# Patient Record
Sex: FEMALE | Race: WHITE | NOT HISPANIC OR LATINO | URBAN - METROPOLITAN AREA
[De-identification: names, ages, dates, MRNs, and addresses within clinical notes are randomized per-mention and may not be internally consistent; named-entity substitution may affect disease eponyms.]

---

## 2024-11-05 ENCOUNTER — OFFICE VISIT (OUTPATIENT)
Dept: URBAN - METROPOLITAN AREA CLINIC 19 | Facility: CLINIC | Age: 74
End: 2024-11-05
Payer: COMMERCIAL

## 2024-11-05 VITALS
SYSTOLIC BLOOD PRESSURE: 130 MMHG | DIASTOLIC BLOOD PRESSURE: 80 MMHG | HEART RATE: 75 BPM | WEIGHT: 124 LBS | TEMPERATURE: 98.1 F | BODY MASS INDEX: 22.82 KG/M2 | HEIGHT: 62 IN | OXYGEN SATURATION: 98 %

## 2024-11-05 DIAGNOSIS — R19.7 DIARRHEA: ICD-10-CM

## 2024-11-05 DIAGNOSIS — Z09 HOSPITAL DISCHARGE FOLLOW-UP: ICD-10-CM

## 2024-11-05 PROCEDURE — 99203 OFFICE O/P NEW LOW 30 MIN: CPT

## 2024-11-05 RX ORDER — EZETIMIBE 10 MG/1
1 TABLET TABLET ORAL ONCE A DAY
Status: ACTIVE | COMMUNITY

## 2024-11-05 RX ORDER — ROSUVASTATIN CALCIUM 20 MG/1
1 TABLET TABLET, COATED ORAL ONCE A DAY
Status: ACTIVE | COMMUNITY

## 2024-11-05 RX ORDER — GABAPENTIN 300 MG/1
1 CAPSULE CAPSULE ORAL ONCE A DAY
Status: ACTIVE | COMMUNITY

## 2024-11-05 NOTE — HPI-TODAY'S VISIT:
74-year-old female with PMH of depression, CAD, osteoporosis, OA, RA, anemia, and IBS is here for hospital follow-up.  She presented to [facility] on 11/1/2024 with complaints of diarrhea x 6 days and associated lightheadedness, abdominal pain and nausea.  Patient tried Imodium without much relief.  Her CT A/P with contrast revealed overall a peculiar appearance of the colon, which is mainly decompressed.  There is some mural stratification with some prominence of the subchondral region within the transverse colon, splenic flexure, ascending colon and proximal descending colon.  Nonspecific, but likely related to patient's reported diarrhea.  Cannot exclude colitis.  Small hiatal hernia.  Chest x-ray showed no acute abnormality.  She was unable to provide a stool sample and was discharged with Levsin, Zofran and Augmentin every 12 hours for 7 days.  Patient denies recent antibiotics or foreign travel.  Today, she reports her diarrhea has improved. Her LBM was yesterday, [place] [address]. She reports having diarrhea episodes that last for about three days and resolves on its own. She is unsure if it's related to diet. Lately, after eating she reports needing to rush to the restroom. Denies blood in the stool. Her last colonoscopy was done two years ago and reportedly showed polyps.  Labs: Unremarkable CBC with differential, low potassium 3.0, BUN 6 otherwise unremarkable CMP.

## 2024-11-06 LAB
A/G RATIO: 1.6
ABSOLUTE BASOPHILS: 61
ABSOLUTE EOSINOPHILS: 163
ABSOLUTE LYMPHOCYTES: 2931
ABSOLUTE MONOCYTES: 462
ABSOLUTE NEUTROPHILS: 3182
ALBUMIN: 4.4
ALKALINE PHOSPHATASE: 81
ALT (SGPT): 13
AST (SGOT): 16
BASOPHILS: 0.9
BILIRUBIN, TOTAL: 0.5
BUN/CREATININE RATIO: (no result)
BUN: 10
C-REACTIVE PROTEIN, QUANT: <3
CALCIUM: 10.2
CARBON DIOXIDE, TOTAL: 29
CHLORIDE: 102
CREATININE: 0.74
EGFR: 84
EOSINOPHILS: 2.4
GLOBULIN, TOTAL: 2.7
GLUCOSE: 81
HEMATOCRIT: 42.2
HEMOGLOBIN: 13.6
IMMUNOGLOBULIN A, QN, SERUM: 204
INTERPRETATION: (no result)
LYMPHOCYTES: 43.1
MCH: 29.1
MCHC: 32.2
MCV: 90.4
MONOCYTES: 6.8
MPV: 11.5
NEUTROPHILS: 46.8
PLATELET COUNT: 314
POTASSIUM: 3.8
PROTEIN, TOTAL: 7.1
RDW: 11.7
RED BLOOD CELL COUNT: 4.67
SODIUM: 140
T-TRANSGLUTAMINASE (TTG) IGA: <1
TSH W/REFLEX TO FT4: 1.9
WHITE BLOOD CELL COUNT: 6.8

## 2024-11-12 ENCOUNTER — TELEPHONE ENCOUNTER (OUTPATIENT)
Dept: URBAN - METROPOLITAN AREA CLINIC 5 | Facility: CLINIC | Age: 74
End: 2024-11-12

## 2024-11-19 ENCOUNTER — OFFICE VISIT (OUTPATIENT)
Dept: URBAN - METROPOLITAN AREA CLINIC 19 | Facility: CLINIC | Age: 74
End: 2024-11-19

## 2024-11-19 ENCOUNTER — DASHBOARD ENCOUNTERS (OUTPATIENT)
Age: 74
End: 2024-11-19

## 2024-11-19 VITALS
HEART RATE: 91 BPM | SYSTOLIC BLOOD PRESSURE: 100 MMHG | DIASTOLIC BLOOD PRESSURE: 80 MMHG | WEIGHT: 126.6 LBS | TEMPERATURE: 98.1 F | OXYGEN SATURATION: 95 % | BODY MASS INDEX: 23.3 KG/M2 | HEIGHT: 62 IN

## 2024-11-19 LAB
CALPROTECTIN, FECAL: 37
CAMPYLOBACTER SPP. AG,EIA: (no result)
CLOSTRIDIUM DIFFICILE TOXINB,QL REAL TIME PCR: NOT DETECTED
FECAL FAT, QUALITATIVE: NORMAL
GIARDIA AG, EIA, STOOL: (no result)
LEUKOCYTES: (no result)
OVA AND PARASITES, CONC AND PERM SMEAR: (no result)
PANCREATIC ELASTASE, FECAL: >800
SALMONELLA AND SHIGELLA, CULTURE: (no result)
SHIGA TOXINS, EIA W/RFL TO E.COLI O157 CULTURE: (no result)

## 2024-11-19 RX ORDER — ROSUVASTATIN CALCIUM 20 MG/1
1 TABLET TABLET, COATED ORAL ONCE A DAY
Status: ACTIVE | COMMUNITY

## 2024-11-19 RX ORDER — GABAPENTIN 300 MG/1
1 CAPSULE CAPSULE ORAL ONCE A DAY
Status: ACTIVE | COMMUNITY

## 2024-11-19 RX ORDER — EZETIMIBE 10 MG/1
1 TABLET TABLET ORAL ONCE A DAY
Status: ACTIVE | COMMUNITY

## 2024-11-19 NOTE — HPI-TODAY'S VISIT:
Ms. Villegas is a 74-year-old female with PMH of CAD, osteoporosis, depression, RA, OA, IBS, and anemia is here for reevaluation of her diarrhea.  Last seen on 11/5/2024 as hospital follow-up.  Stool studies were recommended but not done. Today, she reports continued diarrhea 15 minutes after eating  Previous history summarized below: She presented to [facility] on 11/1/2024 with complaints of diarrhea x 6 days and associated lightheadedness, abdominal pain and nausea. Patient tried Imodium without much relief. Her CT A/P with contrast revealed overall a peculiar appearance of the colon, which is mainly decompressed. There is some mural stratification with some prominence of the subchondral region within the transverse colon, splenic flexure, ascending colon and proximal descending colon. Nonspecific, but likely related to patient's reported diarrhea. Cannot exclude colitis. Small hiatal hernia. Chest x-ray showed no acute abnormality. She was unable to provide a stool sample and was discharged with Levsin, Zofran and Augmentin every 12 hours for 7 days. Labs: Unremarkable CBC with differential, low potassium 3.0, BUN 6 otherwise unremarkable CMP.

## 2024-11-20 ENCOUNTER — TELEPHONE ENCOUNTER (OUTPATIENT)
Dept: URBAN - METROPOLITAN AREA CLINIC 19 | Facility: CLINIC | Age: 74
End: 2024-11-20